# Patient Record
Sex: MALE | Race: WHITE | Employment: FULL TIME | URBAN - METROPOLITAN AREA
[De-identification: names, ages, dates, MRNs, and addresses within clinical notes are randomized per-mention and may not be internally consistent; named-entity substitution may affect disease eponyms.]

---

## 2017-09-26 ENCOUNTER — OFFICE VISIT (OUTPATIENT)
Dept: FAMILY MEDICINE CLINIC | Facility: CLINIC | Age: 42
End: 2017-09-26

## 2017-09-26 ENCOUNTER — APPOINTMENT (OUTPATIENT)
Dept: LAB | Age: 42
End: 2017-09-26
Attending: FAMILY MEDICINE
Payer: COMMERCIAL

## 2017-09-26 VITALS
HEART RATE: 80 BPM | SYSTOLIC BLOOD PRESSURE: 108 MMHG | HEIGHT: 73 IN | DIASTOLIC BLOOD PRESSURE: 82 MMHG | BODY MASS INDEX: 28.23 KG/M2 | WEIGHT: 213 LBS | RESPIRATION RATE: 20 BRPM | TEMPERATURE: 98 F

## 2017-09-26 DIAGNOSIS — Z00.00 ROUTINE PHYSICAL EXAMINATION: ICD-10-CM

## 2017-09-26 DIAGNOSIS — E78.00 PURE HYPERCHOLESTEROLEMIA: ICD-10-CM

## 2017-09-26 LAB
ALBUMIN SERPL BCP-MCNC: 4.3 G/DL (ref 3.5–4.8)
ALBUMIN/GLOB SERPL: 1.5 {RATIO} (ref 1–2)
ALP SERPL-CCNC: 90 U/L (ref 32–100)
ALT SERPL-CCNC: 31 U/L (ref 17–63)
ANION GAP SERPL CALC-SCNC: 8 MMOL/L (ref 0–18)
AST SERPL-CCNC: 27 U/L (ref 15–41)
BACTERIA UR QL AUTO: NEGATIVE /HPF
BILIRUB SERPL-MCNC: 1.3 MG/DL (ref 0.3–1.2)
BILIRUB UR QL: NEGATIVE
BUN SERPL-MCNC: 18 MG/DL (ref 8–20)
BUN/CREAT SERPL: 16.1 (ref 10–20)
CALCIUM SERPL-MCNC: 9.4 MG/DL (ref 8.5–10.5)
CHLORIDE SERPL-SCNC: 102 MMOL/L (ref 95–110)
CHOLEST SERPL-MCNC: 187 MG/DL (ref 110–200)
CO2 SERPL-SCNC: 26 MMOL/L (ref 22–32)
COLOR UR: YELLOW
CREAT SERPL-MCNC: 1.12 MG/DL (ref 0.5–1.5)
ERYTHROCYTE [DISTWIDTH] IN BLOOD BY AUTOMATED COUNT: 13.6 % (ref 11–15)
GLOBULIN PLAS-MCNC: 2.9 G/DL (ref 2.5–3.7)
GLUCOSE SERPL-MCNC: 97 MG/DL (ref 70–99)
GLUCOSE UR-MCNC: NEGATIVE MG/DL
HCT VFR BLD AUTO: 45.2 % (ref 41–52)
HDLC SERPL-MCNC: 34 MG/DL
HGB BLD-MCNC: 15.4 G/DL (ref 13.5–17.5)
HGB UR QL STRIP.AUTO: NEGATIVE
KETONES UR-MCNC: NEGATIVE MG/DL
LDLC SERPL CALC-MCNC: 113 MG/DL (ref 0–99)
LEUKOCYTE ESTERASE UR QL STRIP.AUTO: NEGATIVE
MCH RBC QN AUTO: 30.1 PG (ref 27–32)
MCHC RBC AUTO-ENTMCNC: 34.1 G/DL (ref 32–37)
MCV RBC AUTO: 88.2 FL (ref 80–100)
NITRITE UR QL STRIP.AUTO: NEGATIVE
NONHDLC SERPL-MCNC: 153 MG/DL
OSMOLALITY UR CALC.SUM OF ELEC: 284 MOSM/KG (ref 275–295)
PH UR: 5 [PH] (ref 5–8)
PLATELET # BLD AUTO: 290 K/UL (ref 140–400)
PMV BLD AUTO: 8.7 FL (ref 7.4–10.3)
POTASSIUM SERPL-SCNC: 4.3 MMOL/L (ref 3.3–5.1)
PROT SERPL-MCNC: 7.2 G/DL (ref 5.9–8.4)
PROT UR-MCNC: 30 MG/DL
RBC # BLD AUTO: 5.12 M/UL (ref 4.5–5.9)
RBC #/AREA URNS AUTO: 0 /HPF
SODIUM SERPL-SCNC: 136 MMOL/L (ref 136–144)
SP GR UR STRIP: 1.03 (ref 1–1.03)
TRIGL SERPL-MCNC: 201 MG/DL (ref 1–149)
TSH SERPL-ACNC: 1.08 UIU/ML (ref 0.45–5.33)
UROBILINOGEN UR STRIP-ACNC: <2
VIT C UR-MCNC: NEGATIVE MG/DL
WBC # BLD AUTO: 5.8 K/UL (ref 4–11)
WBC #/AREA URNS AUTO: 0 /HPF

## 2017-09-26 PROCEDURE — 80061 LIPID PANEL: CPT

## 2017-09-26 PROCEDURE — 81001 URINALYSIS AUTO W/SCOPE: CPT

## 2017-09-26 PROCEDURE — 36415 COLL VENOUS BLD VENIPUNCTURE: CPT

## 2017-09-26 PROCEDURE — 84443 ASSAY THYROID STIM HORMONE: CPT

## 2017-09-26 PROCEDURE — 85027 COMPLETE CBC AUTOMATED: CPT

## 2017-09-26 PROCEDURE — 80053 COMPREHEN METABOLIC PANEL: CPT

## 2017-09-26 PROCEDURE — 99396 PREV VISIT EST AGE 40-64: CPT | Performed by: FAMILY MEDICINE

## 2017-09-26 RX ORDER — ATORVASTATIN CALCIUM 20 MG/1
TABLET, FILM COATED ORAL
Qty: 30 TABLET | Refills: 11 | Status: SHIPPED | OUTPATIENT
Start: 2017-09-26 | End: 2017-12-30

## 2017-09-26 NOTE — PROGRESS NOTES
HPI:    Patient ID: Tammie Cha is a 38year old male. Patient is here for routine physical exam. Pt presents to establish care - was former patient of Dr Johanna Obrien  No acute issues. Patient is requesting blood testing. Diet and exercise have been good. Cap Take  by mouth. Disp:  Rfl:    Lactobacillus Rhamnosus, GG, ([facility] PROBIOTIC, LACTOBACILLUS,) Oral Cap Take  by mouth.  Disp:  Rfl:      Allergies:No Known Allergies   PHYSICAL EXAM:   Physical Exam   Constitutional: He appears well-developed and well-nou Prescriptions Disp Refills    atorvastatin 20 MG Oral Tab 30 tablet 11      Sig: TAKE 1 TABLET BY MOUTH EVERY EVENING           Imaging & Referrals:  None       #[phone]

## 2017-09-27 ENCOUNTER — TELEPHONE (OUTPATIENT)
Dept: FAMILY MEDICINE CLINIC | Facility: CLINIC | Age: 42
End: 2017-09-27

## 2017-09-27 DIAGNOSIS — R82.90 ABNORMAL URINALYSIS: Primary | ICD-10-CM

## 2017-10-31 NOTE — TELEPHONE ENCOUNTER
[facility] calling for a refill rx on the med below. Medication Quantity Refills Start End   Ciclopirox 8 % External Solution 1 Bottle 11 9/27/2016    Sig :  Apply 1 Application topically nightly.      Route:   Topical     Order #: [id number]

## 2017-12-30 ENCOUNTER — TELEPHONE (OUTPATIENT)
Dept: FAMILY MEDICINE CLINIC | Facility: CLINIC | Age: 42
End: 2017-12-30

## 2017-12-30 RX ORDER — ATORVASTATIN CALCIUM 20 MG/1
TABLET, FILM COATED ORAL
Qty: 90 TABLET | Refills: 0 | Status: SHIPPED | OUTPATIENT
Start: 2017-12-30 | End: 2018-03-16

## 2017-12-30 NOTE — TELEPHONE ENCOUNTER
Message noted: Chart reviewed and may refill medication as requested times one. Prescription sent to listed pharmacy. Please notify patient to make follow up appointment for further refills with his new physician once established.

## 2017-12-30 NOTE — TELEPHONE ENCOUNTER
Left a detailed message themedication he has requested has been send to his pharmacy. He can call us back with questions or concerns. Thanks

## 2017-12-30 NOTE — TELEPHONE ENCOUNTER
Pt requesting refill on Atorvastatin per [facility]. Spoke with pt and informed he has refills available. Per pt [place] and is now requesting his refill through [address]   Per pt [facility] faxed request directly to  Dr María cardenas

## 2017-12-30 NOTE — TELEPHONE ENCOUNTER
Pt called in requesting a refill on medication below. Pt states that he will be moving to [place] and is requesting the refill go through [facility], so he can get a 90 day supply on the medication.  Pt had requested refill through [facility] and been

## 2018-03-17 RX ORDER — ATORVASTATIN CALCIUM 20 MG/1
TABLET, FILM COATED ORAL
Qty: 90 TABLET | Refills: 1 | Status: SHIPPED | OUTPATIENT
Start: 2018-03-17 | End: 2018-03-28

## 2018-03-28 ENCOUNTER — PATIENT MESSAGE (OUTPATIENT)
Dept: FAMILY MEDICINE CLINIC | Facility: CLINIC | Age: 43
End: 2018-03-28

## 2018-03-28 RX ORDER — ATORVASTATIN CALCIUM 20 MG/1
TABLET, FILM COATED ORAL
Qty: 90 TABLET | Refills: 1 | Status: SHIPPED | OUTPATIENT
Start: 2018-03-28 | End: 2018-04-03

## 2018-03-28 NOTE — TELEPHONE ENCOUNTER
From: Chelsi Buckner  To: Harini Enriquez MD  Sent: 3/28/2018 3:15 PM CDT  Subject: Prescription Question    I requested a new mail order Rx refill for [facility] on 3/17 and you approved it.  However, [facility] is not covered by my new health

## 2018-03-28 NOTE — TELEPHONE ENCOUNTER
3/17/18 atorvastatin 20 mg script by Dr Tanvir Shaffer re-routed to [facility] mail order not [facility] as per pts' request.

## 2018-04-02 ENCOUNTER — TELEPHONE (OUTPATIENT)
Dept: FAMILY MEDICINE CLINIC | Facility: CLINIC | Age: 43
End: 2018-04-02

## 2018-04-02 NOTE — TELEPHONE ENCOUNTER
Patient states that [facility] states that has not received RX  That was sent 03/28/2018 for     atorvastatin 20 MG Oral Tab TAKE 1 TABLET EVERY EVENING Disp: 90 tablet Rfl: 1     Can this please be sent again and pharmacy contacted at [phone].

## 2018-04-02 NOTE — TELEPHONE ENCOUNTER
Pt calling back requesting to have rx transferred to [facility]/[place]- he will be there this week & easier for him to p/u at this pharmacy.

## 2018-04-03 RX ORDER — ATORVASTATIN CALCIUM 20 MG/1
TABLET, FILM COATED ORAL
Qty: 90 TABLET | Refills: 0 | Status: SHIPPED | OUTPATIENT
Start: 2018-04-03 | End: 2018-07-07

## 2018-04-03 NOTE — TELEPHONE ENCOUNTER
Message noted: Chart reviewed and may refill medication as requested times one 90 day supply. Prescription sent to listed pharmacy. Please notify pt.

## 2018-04-03 NOTE — TELEPHONE ENCOUNTER
NP=please advise  If we can re send rx to the pharmacy in [facility], rx pended.

## 2018-07-07 RX ORDER — ATORVASTATIN CALCIUM 20 MG/1
TABLET, FILM COATED ORAL
Qty: 90 TABLET | Refills: 0 | Status: SHIPPED | OUTPATIENT
Start: 2018-07-07

## (undated) NOTE — LETTER
12/15/17        [address] [place] [address],[address]  [phone]      Dear Jaz Senior records indicate that you have outstanding lab work and or testing that was ordered for you and has not yet been completed:          Urinalysis, Routine [

## (undated) NOTE — LETTER
03/15/18        [address] [place] [address],[address]  [phone]      Dear [address] [address] records indicate that you have outstanding lab work and or testing that was ordered for you and has not yet been completed:          Urinalysis, Routine [